# Patient Record
Sex: FEMALE
[De-identification: names, ages, dates, MRNs, and addresses within clinical notes are randomized per-mention and may not be internally consistent; named-entity substitution may affect disease eponyms.]

---

## 2018-01-22 ENCOUNTER — HOSPITAL ENCOUNTER (INPATIENT)
Dept: HOSPITAL 14 - H.ER | Age: 23
LOS: 1 days | Discharge: HOME | DRG: 395 | End: 2018-01-23
Attending: INTERNAL MEDICINE | Admitting: INTERNAL MEDICINE
Payer: COMMERCIAL

## 2018-01-22 DIAGNOSIS — D72.829: ICD-10-CM

## 2018-01-22 DIAGNOSIS — J45.909: ICD-10-CM

## 2018-01-22 DIAGNOSIS — K59.03: ICD-10-CM

## 2018-01-22 DIAGNOSIS — T40.2X5A: ICD-10-CM

## 2018-01-22 DIAGNOSIS — K37: Primary | ICD-10-CM

## 2018-01-22 LAB
ALBUMIN SERPL-MCNC: 4.6 G/DL (ref 3.5–5)
ALBUMIN/GLOB SERPL: 1.3 {RATIO} (ref 1–2.1)
ALT SERPL-CCNC: 50 U/L (ref 9–52)
AST SERPL-CCNC: 30 U/L (ref 14–36)
BACTERIA #/AREA URNS HPF: (no result) /[HPF]
BASOPHILS # BLD AUTO: 0 K/UL (ref 0–0.2)
BASOPHILS NFR BLD: 0.3 % (ref 0–2)
BILIRUB UR-MCNC: NEGATIVE MG/DL
BUN SERPL-MCNC: 8 MG/DL (ref 7–17)
CALCIUM SERPL-MCNC: 9.7 MG/DL (ref 8.4–10.2)
COLOR UR: (no result)
EOSINOPHIL # BLD AUTO: 0.1 K/UL (ref 0–0.7)
EOSINOPHIL NFR BLD: 0.6 % (ref 0–4)
ERYTHROCYTE [DISTWIDTH] IN BLOOD BY AUTOMATED COUNT: 13.5 % (ref 11.5–14.5)
GFR NON-AFRICAN AMERICAN: > 60
GLUCOSE UR STRIP-MCNC: (no result) MG/DL
HGB BLD-MCNC: 12.6 G/DL (ref 12–16)
LEUKOCYTE ESTERASE UR-ACNC: (no result) LEU/UL
LIPASE SERPL-CCNC: 77 U/L (ref 23–300)
LYMPHOCYTES # BLD AUTO: 2.9 K/UL (ref 1–4.3)
LYMPHOCYTES NFR BLD AUTO: 25.6 % (ref 20–40)
MCH RBC QN AUTO: 30 PG (ref 27–31)
MCHC RBC AUTO-ENTMCNC: 33.5 G/DL (ref 33–37)
MCV RBC AUTO: 89.7 FL (ref 81–99)
MONOCYTES # BLD: 0.7 K/UL (ref 0–0.8)
MONOCYTES NFR BLD: 6.4 % (ref 0–10)
NEUTROPHILS # BLD: 7.7 K/UL (ref 1.8–7)
NEUTROPHILS NFR BLD AUTO: 67.1 % (ref 50–75)
NRBC BLD AUTO-RTO: 0.1 % (ref 0–0)
PH UR STRIP: 6 [PH] (ref 5–8)
PLATELET # BLD: 277 K/UL (ref 130–400)
PMV BLD AUTO: 7.3 FL (ref 7.2–11.7)
PROT UR STRIP-MCNC: NEGATIVE MG/DL
RBC # BLD AUTO: 4.2 MIL/UL (ref 3.8–5.2)
RBC # UR STRIP: (no result) /UL
SP GR UR STRIP: 1.01 (ref 1–1.03)
SQUAMOUS EPITHIAL: 2 /HPF (ref 0–5)
URINE CLARITY: CLEAR
URINE NITRATE: NEGATIVE
UROBILINOGEN UR-MCNC: (no result) MG/DL (ref 0.2–1)
WBC # BLD AUTO: 11.5 K/UL (ref 4.8–10.8)

## 2018-01-22 NOTE — ED PDOC
HPI: Abdomen


Time Seen by Provider: 01/22/18 20:31


Chief Complaint (Nursing): Abdominal Pain


Chief Complaint (Provider): Abdominal pain


History Per: Patient


History/Exam Limitations: no limitations


Onset/Duration Of Symptoms: Days (2)


Additional Complaint(s): 





Patient is a 23 y/o female with no significant past medical history presenting 

to the emergency department for intermittent epigastric pain ongoing for two 

days. Reports that she always had stomach problems especially with eating. 

Patient was referred by her PMD for an abdominal CT to rule out appendicitis. 

Denies any current pain, fever, vomiting, nausea, constipation, or other 

complaints.





PMD: Dr. Les Angulo





Past Medical History


Reviewed: Historical Data, Nursing Documentation, Vital Signs


Vital Signs: 


 Last Vital Signs











Temp  98.3 F   01/23/18 01:11


 


Pulse  74   01/23/18 01:11


 


Resp  19   01/23/18 01:11


 


BP  113/72   01/23/18 01:11


 


Pulse Ox  96   01/23/18 01:11














- Family History


Family History: States: Unknown Family Hx





- Social History


Current smoker - smoking cessation education provided: No


Ex-Smoker (has not smoked in the last 12 months): No


Alcohol: Social


Drugs: Denies





- Home Medications


Home Medications: 


 Ambulatory Orders











 Medication  Instructions  Recorded


 


No Known Home Med  01/23/18














- Allergies


Allergies/Adverse Reactions: 


 Allergies











Allergy/AdvReac Type Severity Reaction Status Date / Time


 


No Known Allergies Allergy   Verified 01/22/18 19:53














Review of Systems


ROS Statement: Except As Marked, All Systems Reviewed And Found Negative


Constitutional: Negative for: Fever


Gastrointestinal: Positive for: Abdominal Pain (epigastric).  Negative for: 

Nausea, Vomiting, Constipation





Physical Exam





- Reviewed


Nursing Documentation Reviewed: Yes


Vital Signs Reviewed: Yes





- Physical Exam


Appears: Positive for: Well, Non-toxic, No Acute Distress


Head Exam: Positive for: ATRAUMATIC, NORMAL INSPECTION, NORMOCEPHALIC


Skin: Positive for: Normal Color, Warm, Dry


Eye Exam: Positive for: Normal appearance


Neck: Positive for: Normal


Cardiovascular/Chest: Positive for: Regular Rate, Rhythm


Respiratory: Negative for: Accessory Muscle Use, Respiratory Distress


Gastrointestinal/Abdominal: Positive for: Normal Exam, Soft, Tenderness (

Minimal lower abdominal tenderness bilaterally, and epigastric tenderness)


Extremity: Positive for: Normal ROM.  Negative for: Pedal Edema


Neurologic/Psych: Positive for: Alert, Oriented (x3)





- Laboratory Results


Result Diagrams: 


 01/22/18 21:33





 01/22/18 21:33





- ECG


O2 Sat by Pulse Oximetry: 100 (RA)


Pulse Ox Interpretation: Normal





Medical Decision Making


Medical Decision Making: 





Time: 20:45





Initial impression: gastritis vs. appendicitis vs. ulcer





Initial plan:


 Abdominal/pelvic CT scan


 CMP


 Lactic Acid test


 Lipase


 ED Urine Pregnancy


 CBC


 Pepcid 20 mg 


 Blood Culture


 Urine Culture


 Urinalysis


 Reevaluation





Time: 23:14


CT Abd and Pelvis w/ IV Contrast Findings:


Lower thorax: No acute findings.





ABDOMEN:


Liver: Unremarkable. No mass.


Gallbladder and bile ducts: Unremarkable. No calcified stones. No ductal 

dilation.


Pancreas: Unremarkable. No mass. No ductal dilation.


Spleen: Unremarkable. No splenomegaly.


Adrenals: Unremarkable. No mass.


Kidneys and ureters: Unremarkable. No solid mass. No hydronephrosis.


Stomach and bowel: Unremarkable. No obstruction. No mucosal thickening.


Appendix: The appendix measures approximately 8 mm in diameter with a fluid-

filled lumen and wall


thickness of approximately 3 mm with slight enhancement. There is question of 

slight surrounding


induration may reflect early appendicitis.





PELVIS:


Bladder: The urinary bladder is decompressed but appears grossly normal.


Reproductive: IUD in the lower uterine segment and cervix.





ABDOMEN and PELVIS:


Intraperitoneal space: Unremarkable. No free air. No significant fluid 

collection.


Bones/joints: No acute fracture. No dislocation.


Soft tissues: Unremarkable.


Vasculature: Unremarkable. No abdominal aortic aneurysm.


Lymph nodes: Unremarkable. No enlarged lymph nodes.





IMPRESSION:


1. Low position of an IUD in the lower uterine segment and cervix.


2. The appendix measures 8 mm with a fluid-filled lumen and slight thickening 

and enhancement of


the wall. There is question of slight surrounding induration and findings may 

reflect early appendicitis.


3. Otherwise negative CT abdomen/pelvis. No epigastric abnormalities are 

identified.





2330


Pt. made NPO.  Spoke with Dr. Orta who agrees to consult.  Surgical 

resident aware.  Dr. Nugent accepts patient.  Results explained to patient and 

questions answered.  Patient states pain is 1/10 at this time.





--------------------------------------------------------------------------------

----------------------------------------------


Scribe Attestation:


Documented by Doris Ellis and Ricardo Mattson, acting as scribes for Chi Huang MD.





Provider Scribe Attestation:


All medical record entries made by the Scribe were at my direction and 

personally dictated by me. I have reviewed the chart and agree that the record 

accurately reflects my personal performance of the history, physical exam, 

medical decision making, and the department course for this patient. I have 

also personally directed, reviewed, and agree with the discharge instructions 

and disposition.





Disposition





- Clinical Impression


Clinical Impression: 


 Appendicitis








- Disposition


Disposition Time: 23:30


Condition: STABLE

## 2018-01-22 NOTE — CP.PCM.HP
History of Present Illness





- History of Present Illness


History of Present Illness: 


PCP: Les Angulo MD





Chief complaint: Abdominal Pain





HPI: 22 years old female with  hx of stomach upsets where she takes TUMS. She 

comes referred by her PMD with 2 days of a localized, dull intermittent 

Periumbilical, epigastric pain with intensity 6/10. No fever, nausea, vomits. 

No diarrhea ,dysuria nor urinary frequencies.








PMH: Childhood Asthma





PSH: Denies





SH: No illegal drug use; Never Smoked; occasional Alcohol, Live with family ; 

works as a teacher





FH: States: Unknown Family Hx





Allergies: NKDA





Medication: Reviewed





- Social History


Current smoker - smoking cessation education provided: No


Ex-Smoker (has not smoked in the last 12 months): No


Alcohol: Social


Drugs: Denies





- Allergies


Allergies/Adverse Reactions: 








Present on Admission





- Present on Admission


Any Indicators Present on Admission: No


History of DVT/PE: No


History of Uncontrolled Diabetes: No


Urinary Catheter: No


Decubitus Ulcer Present: No





Review of Systems





- Constitutional


Constitutional: absent: Chills, Fatigue, Fever, Headache





- EENT


Eyes: Requires Corrective Lenses.  absent: Diplopia, Floaters, Sees Flashes


Ears: absent: Decreased Hearing, Ear Discharge, Ear Pain, Tinnitus


Nose/Mouth/Throat: absent: Epistaxis, Nasal Congestion, Nasal Discharge, Sinus 

Pain, Sinus Pressure





- Cardiovascular


Cardiovascular: absent: Chest Pain, Dyspnea, Edema





- Respiratory


Respiratory: absent: Cough, Dyspnea, Wheezing, Stridor, Chest Congestion





- Gastrointestinal


Gastrointestinal: Abdominal Pain.  absent: Constipation, Diarrhea, Vomiting





- Genitourinary


Genitourinary: absent: Dysuria, Flank Pain, Hematuria, Urinary Frequency





- Musculoskeletal


Musculoskeletal: absent: Arthralgias, Back Pain, Joint Swelling, Numbness





- Integumentary


Integumentary: absent: Pruritus, Rash, Skin Ulcer, Sores, Striae, Swelling





- Neurological


Neurological: absent: Confusion, Dizziness, Focal Weakness, Headaches, Weakness





- Psychiatric


Psychiatric: absent: Anxiety, Depression, Panic Attacks





- Endocrine


Endocrine: absent: Palpitations, Polydipsia, Polyphagia, Polyuria





- Hematologic/Lymphatic


Hematologic: absent: Easy Bleeding, Easy Bruising





Past Patient History





- Past Social History


Smoking Status: Never Smoked


Chewing Tobacco Use: No


Cigar Use: No


Alcohol: Social


Drugs: Denies


Home Situation {Lives}: With Family





- CARDIAC


Hx Cardiac Disorders: No





- PULMONARY


Hx Asthma: Yes (Childhood Asthma)





- NEUROLOGICAL


Hx Neurological Disorder: No





- HEENT


Hx HEENT Problems: No





- RENAL


Hx Chronic Kidney Disease: No





- ENDOCRINE/METABOLIC


Hx Endocrine Disorders: No





- HEMATOLOGICAL/ONCOLOGICAL


Hx Blood Disorders: No





- INTEGUMENTARY


Hx Dermatological Problems: No





- MUSCULOSKELETAL/RHEUMATOLOGICAL


Hx Musculoskeletal Disorders: No





- GASTROINTESTINAL


Hx Gastrointestinal Disorders: No





- GENITOURINARY/GYNECOLOGICAL


Hx Genitourinary Disorders: No





- PSYCHIATRIC


Hx Psychophysiologic Disorder: No


Hx Substance Use: No





- SURGICAL HISTORY


Hx Surgeries: No





- ANESTHESIA


Hx Anesthesia: No





Meds


Allergies/Adverse Reactions: 


 Allergies











Allergy/AdvReac Type Severity Reaction Status Date / Time


 


No Known Allergies Allergy   Verified 01/22/18 19:53














Physical Exam





- Constitutional


Appears: No Acute Distress





- Head Exam


Head Exam: ATRAUMATIC, NORMAL INSPECTION, NORMOCEPHALIC





- Eye Exam


Eye Exam: EOMI, Normal appearance


Pupil Exam: NORMAL ACCOMODATION, PERRL





- ENT Exam


ENT Exam: Mucous Membranes Moist, Normal Exam, Normal External Ear Exam





- Neck Exam


Neck exam: Positive for: Full Rom, Normal Inspection.  Negative for: 

Lymphadenopathy, Tenderness





- Respiratory Exam


Respiratory Exam: Clear to Auscultation Bilateral.  absent: Rales, Rhonchi, 

Wheezes





- Cardiovascular Exam


Cardiovascular Exam: REGULAR RHYTHM, RRR, +S1, +S2.  absent: Gallop, JVD, +S4





- GI/Abdominal Exam


GI & Abdominal Exam: Normal Bowel Sounds, Soft.  absent: Organomegaly, 

Tenderness





- Rectal Exam


Rectal Exam: Deferred





- Extremities Exam


Extremities exam: Positive for: normal inspection.  Negative for: full ROM, 

joint swelling, pedal edema





- Back Exam


Back exam: NORMAL INSPECTION.  absent: CVA tenderness (L), CVA tenderness (R)





- Neurological Exam


Neurological exam: Alert, CN II-XII Intact, Oriented x3, Reflexes Normal





- Psychiatric Exam


Psychiatric exam: Normal Affect, Normal Mood





- Skin


Skin Exam: Dry, Intact, Normal Color, Warm





Results





- Vital Signs


Recent Vital Signs: 





 Last Vital Signs











Temp  98.3 F   01/22/18 19:49


 


Pulse  72   01/22/18 19:49


 


Resp  16   01/22/18 19:49


 


BP  123/77   01/22/18 19:49


 


Pulse Ox  100   01/22/18 23:20














- Labs


Result Diagrams: 


 01/22/18 21:33





 01/22/18 21:33


Labs: 





 Laboratory Results - last 24 hr











  01/22/18 01/22/18 01/22/18





  21:33 21:33 21:33


 


WBC  11.5 H  


 


RBC  4.20  


 


Hgb  12.6  


 


Hct  37.6  


 


MCV  89.7  


 


MCH  30.0  


 


MCHC  33.5  


 


RDW  13.5  


 


Plt Count  277  


 


MPV  7.3  


 


Neut % (Auto)  67.1  


 


Lymph % (Auto)  25.6  


 


Mono % (Auto)  6.4  


 


Eos % (Auto)  0.6  


 


Baso % (Auto)  0.3  


 


Neut #  7.7 H  


 


Lymph #  2.9  


 


Mono #  0.7  


 


Eos #  0.1  


 


Baso #  0.0  


 


Sodium   141 


 


Potassium   4.1 


 


Chloride   102 


 


Carbon Dioxide   26 


 


Anion Gap   17 


 


BUN   8 


 


Creatinine   0.6 L 


 


Est GFR ( Amer)   > 60 


 


Est GFR (Non-Af Amer)   > 60 


 


Random Glucose   91 


 


Lactic Acid   


 


Calcium   9.7 


 


Total Bilirubin   0.4 


 


AST   30 


 


ALT   50 


 


Alkaline Phosphatase   74 


 


Total Protein   8.1 


 


Albumin   4.6 


 


Globulin   3.6 


 


Albumin/Globulin Ratio   1.3 


 


Lipase   77 


 


Urine Color    Straw


 


Urine Clarity    Clear


 


Urine pH    6.0


 


Ur Specific Gravity    1.012


 


Urine Protein    Negative


 


Urine Glucose (UA)    Neg


 


Urine Ketones    Trace


 


Urine Blood    Small


 


Urine Nitrate    Negative


 


Urine Bilirubin    Negative


 


Urine Urobilinogen    0.2-1.0


 


Ur Leukocyte Esterase    Small


 


Urine RBC (Auto)    7 H


 


Urine Microscopic WBC    8 H


 


Ur Squamous Epith Cells    2


 


Urine Bacteria    Rare














  01/22/18





  21:33


 


WBC 


 


RBC 


 


Hgb 


 


Hct 


 


MCV 


 


MCH 


 


MCHC 


 


RDW 


 


Plt Count 


 


MPV 


 


Neut % (Auto) 


 


Lymph % (Auto) 


 


Mono % (Auto) 


 


Eos % (Auto) 


 


Baso % (Auto) 


 


Neut # 


 


Lymph # 


 


Mono # 


 


Eos # 


 


Baso # 


 


Sodium 


 


Potassium 


 


Chloride 


 


Carbon Dioxide 


 


Anion Gap 


 


BUN 


 


Creatinine 


 


Est GFR ( Amer) 


 


Est GFR (Non-Af Amer) 


 


Random Glucose 


 


Lactic Acid  0.8


 


Calcium 


 


Total Bilirubin 


 


AST 


 


ALT 


 


Alkaline Phosphatase 


 


Total Protein 


 


Albumin 


 


Globulin 


 


Albumin/Globulin Ratio 


 


Lipase 


 


Urine Color 


 


Urine Clarity 


 


Urine pH 


 


Ur Specific Gravity 


 


Urine Protein 


 


Urine Glucose (UA) 


 


Urine Ketones 


 


Urine Blood 


 


Urine Nitrate 


 


Urine Bilirubin 


 


Urine Urobilinogen 


 


Ur Leukocyte Esterase 


 


Urine RBC (Auto) 


 


Urine Microscopic WBC 


 


Ur Squamous Epith Cells 


 


Urine Bacteria 














- Imaging and Cardiology


  ** CT scan - abdomen


Status: Report reviewed by me


Additional comment: 





EXAM:


CT Abdomen and Pelvis With Intravenous Contrast


EXAM DATE/TIME:


Exam ordered 1/22/2018 8:45 PM





FINDINGS:


Lower thorax: No acute findings.


ABDOMEN:


Liver: Unremarkable. No mass.


Gallbladder and bile ducts: Unremarkable. No calcified stones. No ductal 

dilation.


Pancreas: Unremarkable. No mass. No ductal dilation.


Spleen: Unremarkable. No splenomegaly.


Adrenals: Unremarkable. No mass.


Kidneys and ureters: Unremarkable. No solid mass. No hydronephrosis.


Stomach and bowel: Unremarkable. No obstruction. No mucosal thickening.


Appendix: The appendix measures approximately 8 mm in diameter with a fluid-

filled lumen and wall


thickness of approximately 3 mm with slight enhancement. There is question of 

slight surrounding


induration may reflect early appendicitis.


PELVIS:


Bladder: The urinary bladder is decompressed but appears grossly normal.


Reproductive: IUD in the lower uterine segment and cervix.


ABDOMEN and PELVIS:


Intraperitoneal space: Unremarkable. No free air. No significant fluid 

collection.


Bones/joints: No acute fracture. No dislocation.


Soft tissues: Unremarkable.


Vasculature: Unremarkable. No abdominal aortic aneurysm.


Lymph nodes: Unremarkable. No enlarged lymph nodes.





IMPRESSION:


1. Low position of an IUD in the lower uterine segment and cervix.


2. The appendix measures 8 mm with a fluid-filled lumen and slight thickening 

and enhancement of


the wall. There is question of slight surrounding induration and findings may 

reflect early appendicitis.


3. Otherwise negative CT abdomen/pelvis. No epigastric abnormalities are 

identified.








Assessment & Plan





- Assessment and Plan (Free Text)


Assessment: 





#. Early Appendicites


#. Leukocytosis





Plan: 


22 years old female with  hx of stomach upsets where she takes TUMS. She comes 

referred by her PMD with 2 days of a localized, dull intermittent Periumbilical

, epigastric pain with intensity 6/10. No fever, nausea, vomits. No diarrhea ,

dysuria nor urinary frequencies.





#. Early Appendicites causing abdominal pain


- Consult Surgery Dr Coffey


- NPO


- IV fluids


- Zosyn


- Pepcid





#. Leukocytosis


- Follow CBC





# Stress ulcer prophylaxis with Pepcid





#. DVT prophylaxis with SCD





#. Code Status: Full








- Date & Time


Date: 01/22/18


Time: 23:39

## 2018-01-23 VITALS
SYSTOLIC BLOOD PRESSURE: 117 MMHG | OXYGEN SATURATION: 98 % | DIASTOLIC BLOOD PRESSURE: 73 MMHG | TEMPERATURE: 97.9 F | RESPIRATION RATE: 20 BRPM | HEART RATE: 64 BPM

## 2018-01-23 LAB
BASOPHILS # BLD AUTO: 0 K/UL (ref 0–0.2)
BASOPHILS NFR BLD: 0.5 % (ref 0–2)
EOSINOPHIL # BLD AUTO: 0.1 K/UL (ref 0–0.7)
EOSINOPHIL NFR BLD: 1.2 % (ref 0–4)
ERYTHROCYTE [DISTWIDTH] IN BLOOD BY AUTOMATED COUNT: 13.1 % (ref 11.5–14.5)
HGB BLD-MCNC: 11.9 G/DL (ref 12–16)
LYMPHOCYTES # BLD AUTO: 2.4 K/UL (ref 1–4.3)
LYMPHOCYTES NFR BLD AUTO: 31.1 % (ref 20–40)
MCH RBC QN AUTO: 30.1 PG (ref 27–31)
MCHC RBC AUTO-ENTMCNC: 33.8 G/DL (ref 33–37)
MCV RBC AUTO: 89 FL (ref 81–99)
MONOCYTES # BLD: 0.7 K/UL (ref 0–0.8)
MONOCYTES NFR BLD: 9.1 % (ref 0–10)
NEUTROPHILS # BLD: 4.5 K/UL (ref 1.8–7)
NEUTROPHILS NFR BLD AUTO: 58.1 % (ref 50–75)
NRBC BLD AUTO-RTO: 0 % (ref 0–0)
PLATELET # BLD: 268 K/UL (ref 130–400)
PMV BLD AUTO: 7.5 FL (ref 7.2–11.7)
RBC # BLD AUTO: 3.94 MIL/UL (ref 3.8–5.2)
WBC # BLD AUTO: 7.8 K/UL (ref 4.8–10.8)

## 2018-01-23 RX ADMIN — WATER SCH MLS/HR: 1 INJECTION INTRAMUSCULAR; INTRAVENOUS; SUBCUTANEOUS at 05:33

## 2018-01-23 RX ADMIN — WATER SCH MLS/HR: 1 INJECTION INTRAMUSCULAR; INTRAVENOUS; SUBCUTANEOUS at 10:59

## 2018-01-23 NOTE — CP.PCM.PN
Subjective





- Date & Time of Evaluation


Date of Evaluation: 01/23/18


Time of Evaluation: 08:21





- Subjective


Subjective: 





22 years old female with no significant PMHx was seen and evaluated at bedside 

for acute stomach pain. Patient is AAOx3 and is in NAD. Patient denies of any 

acute overnight events. Patient reports that she is feeling a lot better than 

she did yesterday but still feels little tenderness on the right side of her 

belly. Denies of any recent F/N/V/C/SOB/CP/headache/diarrhea. Denies of having 

any other complains at this time. 





Objective





- Vital Signs/Intake and Output


Vital Signs (last 24 hours): 


 











Temp Pulse Resp BP Pulse Ox


 


 97.9 F   64   20   117/73   98 


 


 01/23/18 08:16  01/23/18 08:16  01/23/18 08:16  01/23/18 08:16  01/23/18 08:16











- Medications


Medications: 


 Current Medications





Famotidine (Pepcid)  20 mg IVP DAILY [facility]


Hydromorphone HCl (Dilaudid)  0.5 mg IVP Q3H PRN


   PRN Reason: Pain, severe (8-10)


Piperacillin Sod/Tazobactam (Sod 3.375 gm/ Sodium Chloride)  100 mls @ 100 mls/

hr IVPB Q6H [facility]MI[facility]


   PRN Reason: Protocol


   Last Admin: 01/23/18 05:33 Dose:  100 mls/hr


Lactated Ringer's (Lactated Ringer's)  1,000 mls @ 125 mls/hr IV .Q8H EMILE


   Last Admin: 01/23/18 05:33 Dose:  125 mls/hr


Ondansetron HCl (Zofran Inj)  4 mg IVP Q4 PRN


   PRN Reason: Nausea/Vomiting











- Labs


Labs: 


 





 01/23/18 05:45 





 01/22/18 21:33 











- Constitutional


Appears: Well, Non-toxic, No Acute Distress





- Head Exam


Head Exam: ATRAUMATIC





- Eye Exam


Eye Exam: Normal appearance





- ENT Exam


ENT Exam: Normal Exam





- Neck Exam


Neck Exam: Full ROM, Normal Inspection





- Respiratory Exam


Respiratory Exam: Clear to Ausculation Bilateral, NORMAL BREATHING PATTERN.  

absent: Rales, Rhonchi, Wheezes





- Cardiovascular Exam


Cardiovascular Exam: REGULAR RHYTHM, +S1, +S2





- GI/Abdominal Exam


GI & Abdominal Exam: Soft, Normal Bowel Sounds


Additional comments: 





dull periumbilical, epigastric tenderness





- Rectal Exam


Rectal Exam: Deferred





- Extremities Exam


Extremities Exam: Full ROM, Normal Capillary Refill, Normal Inspection.  absent

: Calf Tenderness, Joint Swelling, Pedal Edema, Tenderness





- Back Exam


Back Exam: Full ROM, NORMAL INSPECTION





- Neurological Exam


Neurological Exam: Alert, Awake, Oriented x3





- Psychiatric Exam


Psychiatric exam: Normal Affect, Normal Mood





- Skin


Skin Exam: Intact, Normal Color, Warm





Assessment and Plan





- Assessment and Plan (Free Text)


Assessment: 





22 years old female with no significant PMHx was at bedside for acute stomach 

pain secondary to early appendicitis


Plan: 





1). Early Appendicites causing abdominal pain


- CT Abdomen


   - Reveals 8mm fluid filled appendix with thickening.


- Surgery Consult - Dr Coffey; Recommendations appreciated


   - no surgery recommended at this time


- GI consult - Dr. Salinas 


   - Outpatient Endoscopy


   - Regular diet


   - Abx


- IV fluids


- Zosyn


- Pepcid





2). DVT prophylaxis 


- SCD


- Ambulating

## 2018-01-23 NOTE — CP.PCM.PCO
Physician Communication Note





- Physician Communication Note


Physician Communication Note: No appendicitis - start diet - enema - d/c if 

tolerates w/ BM

## 2018-01-23 NOTE — CP.PCM.DIS
<[facility] - Last Filed: 01/23/18 14:28>





Provider





- Provider


Date of Admission: 


01/22/18 23:30





Attending physician: 


Aniceto Nugent





Time Spent in preparation of Discharge (in minutes): 20





Hospital Course





- Lab Results


Lab Results: 


 Most Recent Lab Values











WBC  7.8 K/uL (4.8-10.8)   01/23/18  05:45    


 


RBC  3.94 Mil/uL (3.80-5.20)   01/23/18  05:45    


 


Hgb  11.9 g/dL (12.0-16.0)  L  01/23/18  05:45    


 


Hct  35.0 % (34.0-47.0)   01/23/18  05:45    


 


MCV  89.0 fl (81.0-99.0)   01/23/18  05:45    


 


MCH  30.1 pg (27.0-31.0)   01/23/18  05:45    


 


MCHC  33.8 g/dL (33.0-37.0)   01/23/18  05:45    


 


RDW  13.1 % (11.5-14.5)   01/23/18  05:45    


 


Plt Count  268 K/uL (130-400)   01/23/18  05:45    


 


MPV  7.5 fl (7.2-11.7)   01/23/18  05:45    


 


Neut % (Auto)  58.1 % (50.0-75.0)   01/23/18  05:45    


 


Lymph % (Auto)  31.1 % (20.0-40.0)   01/23/18  05:45    


 


Mono % (Auto)  9.1 % (0.0-10.0)   01/23/18  05:45    


 


Eos % (Auto)  1.2 % (0.0-4.0)   01/23/18  05:45    


 


Baso % (Auto)  0.5 % (0.0-2.0)   01/23/18  05:45    


 


Neut #  4.5 K/uL (1.8-7.0)   01/23/18  05:45    


 


Lymph #  2.4 K/uL (1.0-4.3)   01/23/18  05:45    


 


Mono #  0.7 K/uL (0.0-0.8)   01/23/18  05:45    


 


Eos #  0.1 K/uL (0.0-0.7)   01/23/18  05:45    


 


Baso #  0.0 K/uL (0.0-0.2)   01/23/18  05:45    


 


Sodium  141 mmol/l (132-148)   01/22/18  21:33    


 


Potassium  4.1 MMOL/L (3.6-5.0)   01/22/18  21:33    


 


Chloride  102 mmol/L ()   01/22/18  21:33    


 


Carbon Dioxide  26 mmol/L (22-30)   01/22/18  21:33    


 


Anion Gap  17  (10-20)   01/22/18  21:33    


 


BUN  8 mg/dl (7-17)   01/22/18  21:33    


 


Creatinine  0.6 mg/dl (0.7-1.2)  L  01/22/18  21:33    


 


Est GFR ( Amer)  > 60   01/22/18  21:33    


 


Est GFR (Non-Af Amer)  > 60   01/22/18  21:33    


 


Random Glucose  91 mg/dL ()   01/22/18  21:33    


 


Lactic Acid  0.8 MMOL/L (0.7-2.1)   01/22/18  21:33    


 


Calcium  9.7 mg/dL (8.4-10.2)   01/22/18  21:33    


 


Total Bilirubin  0.4 mg/dl (0.2-1.3)   01/22/18  21:33    


 


AST  30 U/L (14-36)   01/22/18  21:33    


 


ALT  50 U/L (9-52)   01/22/18  21:33    


 


Alkaline Phosphatase  74 U/L ()   01/22/18  21:33    


 


Total Protein  8.1 G/DL (6.3-8.2)   01/22/18  21:33    


 


Albumin  4.6 g/dL (3.5-5.0)   01/22/18  21:33    


 


Globulin  3.6 gm/dL (2.2-3.9)   01/22/18  21:33    


 


Albumin/Globulin Ratio  1.3  (1.0-2.1)   01/22/18  21:33    


 


Lipase  77 U/L ()   01/22/18  21:33    


 


Urine Color  Straw  (YELLOW)   01/22/18  21:33    


 


Urine Clarity  Clear  (Clear)   01/22/18 21:33    


 


Urine pH  6.0  (5.0-8.0)   01/22/18 21:33    


 


Ur Specific Gravity  1.012  (1.003-1.030)   01/22/18 21:33    


 


Urine Protein  Negative mg/dL (NEGATIVE)   01/22/18 21:33    


 


Urine Glucose (UA)  Neg mg/dL (Normal)   01/22/18 21:33    


 


Urine Ketones  Trace mg/dL (NEGATIVE)   01/22/18 21:33    


 


Urine Blood  Small  (NEGATIVE)   01/22/18  21:33    


 


Urine Nitrate  Negative  (NEGATIVE)   01/22/18 21:33    


 


Urine Bilirubin  Negative  (NEGATIVE)   01/22/18 21:33    


 


Urine Urobilinogen  0.2-1.0 mg/dL (0.2-1.0)   01/22/18 21:33    


 


Ur Leukocyte Esterase  Small Chago/uL (Negative)   01/22/18 21:33    


 


Urine RBC (Auto)  7 /hpf (0-3)  H  01/22/18 21:33    


 


Urine Microscopic WBC  8 /hpf (0-5)  H  01/22/18 21:33    


 


Ur Squamous Epith Cells  2 /hpf (0-5)   01/22/18 21:33    


 


Urine Bacteria  Rare  (<OCC)   01/22/18 21:33    














- Hospital Course


Hospital Course: 





22 years old female patient with no significant PMHx was admitted to the 

hospital for acute stomach pain with suspicion of appendicitis. Upon arrival to 

the ED, patient reported that she had been having 2 days of a localized, dull 

intermittent periumbilical, epigastric pain with intensity 6/10. Patient was 

observed to have elevated WBC in the ED. Abdominal CT revealed 8mm fluid filled 

appendix with thickening. Patient was placed on NPO and was given zosyn and 

fluids. Patient was seen by general surgery and GI. Patient's condition 

improved while in the hospital. General surgery recommended for patient to go 

home with Augmentin 875 for 10 days. Patient will be following up with Dr. Salinas as an outpatient. 





1). Early Appendicites causing abdominal pain


- CT Abdomen


   - Reveals 8mm fluid filled appendix with thickening.


- Surgery Consult - Dr Coffey; Recommendations appreciated


   - no surgery recommended at this time


- GI consult - Dr. Salinas 


   - Outpatient Endoscopy


   - Regular diet


   - Abx as per surgery


   - Protonix


- IV fluids


- Zosyn


- Pepcid





- Date & Time of H&P


Date of H&P: 01/23/18


Time of H&P: 14:28





Discharge Exam





- Head Exam


Head Exam: ATRAUMATIC, NORMOCEPHALIC





- Eye Exam


Eye Exam: Normal appearance


Pupil Exam: NORMAL ACCOMODATION





- ENT Exam


ENT Exam: Normal Exam





- Neck Exam


Neck exam: Full Rom, Normal Inspection





- Respiratory Exam


Respiratory Exam: Clear to PA & Lateral, NORMAL BREATHING PATTERN, 

UNREMARKABLE.  absent: Rales, Rhonchi, Wheezes





- Cardiovascular Exam


Cardiovascular Exam: REGULAR RHYTHM, +S1, +S2





- GI/Abdominal Exam


GI & Abdominal Exam: Normal Bowel Sounds, Soft, Unremarkable.  absent: Mass, 

Organomegaly





- Rectal Exam


Rectal Exam: Deferred





- Extremities Exam


Extremities exam: full ROM, normal capillary refill, normal inspection, pedal 

pulses present





- Back Exam


Back exam: FULL ROM, NORMAL INSPECTION





- Neurological Exam


Neurological exam: Alert, Oriented x3





- Psychiatric Exam


Psychiatric exam: Normal Affect, Normal Mood





- Skin


Skin Exam: Intact, Normal Color, Warm





Discharge Plan





- Discharge Medications


Prescriptions: 


Amoxicillin/Clavulanate [Augmentin 875 MG-125 MG] 1 tab PO Q12 #20 tab


Pantoprazole [Protonix EC Tab] 40 mg PO DAILY #30 ect





- Follow Up Plan


Condition: STABLE


Disposition: HOME/ ROUTINE


Instructions:  Acute Abdominal Pain (DC)


Additional Instructions: 


follow up with  in 7-10days


Referrals: 


Rita XIE,MD Sarkis [Medical Doctor] - 


Otoniel Muñoz MD [Staff Provider] - 





<Tammie Ledbetter - Last Filed: 01/23/18 15:33>





Provider





- Provider


Date of Admission: 


01/22/18 23:30





Attending physician: 


Aniceto Nugent








[facility] Course





- Lab Results


Lab Results: 


 Most Recent Lab Values











WBC  7.8 K/uL (4.8-10.8)   01/23/18  05:45    


 


RBC  3.94 Mil/uL (3.80-5.20)   01/23/18  05:45    


 


Hgb  11.9 g/dL (12.0-16.0)  L  01/23/18  05:45    


 


Hct  35.0 % (34.0-47.0)   01/23/18  05:45    


 


MCV  89.0 fl (81.0-99.0)   01/23/18  05:45    


 


MCH  30.1 pg (27.0-31.0)   01/23/18  05:45    


 


MCHC  33.8 g/dL (33.0-37.0)   01/23/18  05:45    


 


RDW  13.1 % (11.5-14.5)   01/23/18  05:45    


 


Plt Count  268 K/uL (130-400)   01/23/18  05:45    


 


MPV  7.5 fl (7.2-11.7)   01/23/18  05:45    


 


Neut % (Auto)  58.1 % (50.0-75.0)   01/23/18  05:45    


 


Lymph % (Auto)  31.1 % (20.0-40.0)   01/23/18  05:45    


 


Mono % (Auto)  9.1 % (0.0-10.0)   01/23/18  05:45    


 


Eos % (Auto)  1.2 % (0.0-4.0)   01/23/18  05:45    


 


Baso % (Auto)  0.5 % (0.0-2.0)   01/23/18  05:45    


 


Neut #  4.5 K/uL (1.8-7.0)   01/23/18  05:45    


 


Lymph #  2.4 K/uL (1.0-4.3)   01/23/18  05:45    


 


Mono #  0.7 K/uL (0.0-0.8)   01/23/18  05:45    


 


Eos #  0.1 K/uL (0.0-0.7)   01/23/18  05:45    


 


Baso #  0.0 K/uL (0.0-0.2)   01/23/18  05:45    


 


Sodium  141 mmol/l (132-148)   01/22/18  21:33    


 


Potassium  4.1 MMOL/L (3.6-5.0)   01/22/18  21:33    


 


Chloride  102 mmol/L ()   01/22/18  21:33    


 


Carbon Dioxide  26 mmol/L (22-30)   01/22/18  21:33    


 


Anion Gap  17  (10-20)   01/22/18  21:33    


 


BUN  8 mg/dl (7-17)   01/22/18  21:33    


 


Creatinine  0.6 mg/dl (0.7-1.2)  L  01/22/18  21:33    


 


Est GFR ( Amer)  > 60   01/22/18  21:33    


 


Est GFR (Non-Af Amer)  > 60   01/22/18  21:33    


 


Random Glucose  91 mg/dL ()   01/22/18  21:33    


 


Lactic Acid  0.8 MMOL/L (0.7-2.1)   01/22/18  21:33    


 


Calcium  9.7 mg/dL (8.4-10.2)   01/22/18  21:33    


 


Total Bilirubin  0.4 mg/dl (0.2-1.3)   01/22/18  21:33    


 


AST  30 U/L (14-36)   01/22/18  21:33    


 


ALT  50 U/L (9-52)   01/22/18  21:33    


 


Alkaline Phosphatase  74 U/L ()   01/22/18  21:33    


 


Total Protein  8.1 G/DL (6.3-8.2)   01/22/18  21:33    


 


Albumin  4.6 g/dL (3.5-5.0)   01/22/18  21:33    


 


Globulin  3.6 gm/dL (2.2-3.9)   01/22/18  21:33    


 


Albumin/Globulin Ratio  1.3  (1.0-2.1)   01/22/18  21:33    


 


Lipase  77 U/L ()   01/22/18  21:33    


 


Urine Color  Straw  (YELLOW)   01/22/18  21:33    


 


Urine Clarity  Clear  (Clear)   01/22/18  21:33    


 


Urine pH  6.0  (5.0-8.0)   01/22/18  21:33    


 


Ur Specific Gravity  1.012  (1.003-1.030)   01/22/18  21:33    


 


Urine Protein  Negative mg/dL (NEGATIVE)   01/22/18 21:33    


 


Urine Glucose (UA)  Neg mg/dL (Normal)   01/22/18 21:33    


 


Urine Ketones  Trace mg/dL (NEGATIVE)   01/22/18  21:33    


 


Urine Blood  Small  (NEGATIVE)   01/22/18  21:33    


 


Urine Nitrate  Negative  (NEGATIVE)   01/22/18 21:33    


 


Urine Bilirubin  Negative  (NEGATIVE)   01/22/18  21:33    


 


Urine Urobilinogen  0.2-1.0 mg/dL (0.2-1.0)   01/22/18  21:33    


 


Ur Leukocyte Esterase  Small Chago/uL (Negative)   01/22/18  21:33    


 


Urine RBC (Auto)  7 /hpf (0-3)  H  01/22/18  21:33    


 


Urine Microscopic WBC  8 /hpf (0-5)  H  01/22/18  21:33    


 


Ur Squamous Epith Cells  2 /hpf (0-5)   01/22/18  21:33    


 


Urine Bacteria  Rare  (<OCC)   01/22/18  21:33    














Attending/Attestation





- Attestation


I have personally seen and examined this patient.: Yes


I have fully participated in the care of the patient.: Yes


I have reviewed all pertinent clinical information, including history, physical 

exam and plan: Yes


Notes (Text): 





01/23/18 15:32


seen examined discussed [facility] resident dr. tashia johnson. agree with findings 

and plan as above


to note: zosyn adjusted to Augmentin, Zofran also given for nausea. Patient may 

take motrin for pain control. 


For follow up with PCP in one week.

## 2018-01-23 NOTE — CT
PROCEDURE:  CT Abdomen and Pelvis with contrast



HISTORY:

epig/yaritza-umb pain



COMPARISON:

None.



TECHNIQUE:

Contrast dose: 90 cc Omnipaque 300 



Radiation dose:



Total exam DLP = 401.09 mGy-cm.



This CT exam was performed using one or more of the following dose 

reduction techniques: Automated exposure control, adjustment of the 

mA and/or kV according to patient size, and/or use of iterative 

reconstruction technique.



FINDINGS:



LOWER THORAX:

Unremarkable. 



LIVER:

Unremarkable. No gross lesion or ductal dilatation. 



GALLBLADDER AND BILE DUCTS:

Unremarkable. 



PANCREAS:

Unremarkable. No gross lesion or ductal dilatation.



SPLEEN:

Unremarkable. 



ADRENALS:

Unremarkable. No mass. 



KIDNEYS AND URETERS:

Unremarkable. No hydronephrosis. No solid mass. 



VASCULATURE:

Unremarkable. No aortic aneurysm. 



BOWEL:

Unremarkable. No obstruction. No gross mural thickening. 



APPENDIX:

Mildly edematous appendix, thickening contrast-enhancing wall of the 

appendix consistent with early acute appendicitis. 



PERITONEUM:

Unremarkable. No free fluid. No free air. 



LYMPH NODES:

Unremarkable. No enlarged lymph nodes. 



BLADDER:

Unremarkable. 



REPRODUCTIVE:

Low-lying intrauterine contraceptive device, of portions are in the 

cervix common the remainder in the lower uterine segment. Multiple 

subcentimeter follicles. 



BONES:

No acute fracture. 



OTHER FINDINGS:

None.



IMPRESSION:

Early acute appendicitis.



Additional benign and/or incidental findings described above.



________________________________________________



Concordant results (preliminary interpretation) provided by [employer].



Procedure Completed: 22:16



Preliminary ([employer]) Report: Dictated and Authenticated: 23:14



Final Interpretation: 09:35  Januray 23, 2018.

## 2022-05-05 NOTE — CP.PCM.CON
History of Present Illness





- History of Present Illness


History of Present Illness: 





Addendum to Dr Lanier note- Not able to sign his note due to computer glitch





Patient seen and examined with GI fellow earlier in the day. This is a 22 yr 

old F with no significant medical history who presents to the ER with abdominal 

pain in setting of acute appendicitis which was ruled out by surgical service. 

GI requested to see the patient for gastritis. Denies nausea, vomiting. States 

has intermittent epigastric pain. NO history of NSIAD use. Continue PPI and 

follow up in office in 2 weeks for outpatient EGD 











Past Patient History





- Past Medical History & Family History


Past Medical History?: Yes





- Past Social History


Alcohol: Social


Drugs: Denies





- CARDIAC


Hx Cardiac Disorders: No





- PULMONARY


Hx Respiratory Disorders: Yes


Hx Asthma: Yes (Childhood Asthma)


Hx Bronchitis: Yes


Hx Pneumonia: Yes





- NEUROLOGICAL


Hx Neurological Disorder: No





- HEENT


Hx HEENT Problems: No





- RENAL


Hx Chronic Kidney Disease: No





- ENDOCRINE/METABOLIC


Hx Endocrine Disorders: No





- HEMATOLOGICAL/ONCOLOGICAL


Hx Blood Disorders: No





- INTEGUMENTARY


Hx Dermatological Problems: No





- MUSCULOSKELETAL/RHEUMATOLOGICAL


Hx Musculoskeletal Disorders: No


Hx Falls: No





- GASTROINTESTINAL


Hx Gastrointestinal Disorders: Yes


Other/Comment: Hx of stomach upsets after eating too fast or spicy food as per 

pt





- GENITOURINARY/GYNECOLOGICAL


Hx Genitourinary Disorders: No





- PSYCHIATRIC


Hx Substance Use: No





- SURGICAL HISTORY


Hx Surgeries: No





- ANESTHESIA


Hx Anesthesia: No





Meds


Home Medications: 


 Home Medication List











 Medication  Instructions  Recorded  Confirmed  Type


 


Amoxicillin/Clavulanate [Augmentin 1 tab PO Q12 #20 tab 01/23/18  Rx





875 MG-125 MG]    


 


Pantoprazole [Protonix EC Tab] 40 mg PO DAILY #30 ect 01/23/18  Rx











Allergies/Adverse Reactions: 


 Allergies











Allergy/AdvReac Type Severity Reaction Status Date / Time


 


No Known Allergies Allergy   Verified 01/22/18 19:53














- Medications


Medications: 


 Current Medications





Famotidine (Pepcid)  20 mg IVP DAILY [facility]


   Last Admin: 01/23/18 08:47 Dose:  20 mg


Piperacillin Sod/Tazobactam (Sod 3.375 gm/ Sodium Chloride)  100 mls @ 100 mls/

hr IVPB Q6H [facility]


   PRN Reason: Protocol


   Last Admin: 01/23/18 10:59 Dose:  100 mls/hr


Lactated Ringer's (Lactated Ringer's)  1,000 mls @ 125 mls/hr IV .Q8H [facility]


   Last Admin: 01/23/18 05:33 Dose:  125 mls/hr


Ketorolac Tromethamine (Toradol)  30 mg IVP Q6 PRN


   PRN Reason: Pain, severe (8-10)


Ketorolac Tromethamine (Toradol)  15 mg IVP Q6 PRN


   PRN Reason: Pain, moderate (4-7)


   Last Admin: 01/23/18 13:40 Dose:  15 mg


Ondansetron HCl (Zofran Inj)  4 mg IVP Q4 PRN


   PRN Reason: Nausea/Vomiting


   Last Admin: 01/23/18 11:18 Dose:  4 mg


Pantoprazole Sodium (Protonix Ec Tab)  40 mg PO DAILY [facility]











Results





- Vital Signs


Recent Vital Signs: 


 Last Vital Signs











Temp  97.9 F   01/23/18 08:16


 


Pulse  64   01/23/18 08:16


 


Resp  20   01/23/18 08:16


 


BP  117/73   01/23/18 08:16


 


Pulse Ox  98   01/23/18 08:16














- Labs


Result Diagrams: 


 01/23/18 05:45





 01/22/18 21:33


Labs: 


 Laboratory Results - last 24 hr











  01/22/18 01/22/18 01/22/18





  21:33 21:33 21:33


 


WBC  11.5 H  


 


RBC  4.20  


 


Hgb  12.6  


 


Hct  37.6  


 


MCV  89.7  


 


MCH  30.0  


 


MCHC  33.5  


 


RDW  13.5  


 


Plt Count  277  


 


MPV  7.3  


 


Neut % (Auto)  67.1  


 


Lymph % (Auto)  25.6  


 


Mono % (Auto)  6.4  


 


Eos % (Auto)  0.6  


 


Baso % (Auto)  0.3  


 


Neut #  7.7 H  


 


Lymph #  2.9  


 


Mono #  0.7  


 


Eos #  0.1  


 


Baso #  0.0  


 


Sodium   141 


 


Potassium   4.1 


 


Chloride   102 


 


Carbon Dioxide   26 


 


Anion Gap   17 


 


BUN   8 


 


Creatinine   0.6 L 


 


Est GFR ( Amer)   > 60 


 


Est GFR (Non-Af Amer)   > 60 


 


Random Glucose   91 


 


Lactic Acid   


 


Calcium   9.7 


 


Total Bilirubin   0.4 


 


AST   30 


 


ALT   50 


 


Alkaline Phosphatase   74 


 


Total Protein   8.1 


 


Albumin   4.6 


 


Globulin   3.6 


 


Albumin/Globulin Ratio   1.3 


 


Lipase   77 


 


Urine Color    Straw


 


Urine Clarity    Clear


 


Urine pH    6.0


 


Ur Specific Gravity    1.012


 


Urine Protein    Negative


 


Urine Glucose (UA)    Neg


 


Urine Ketones    Trace


 


Urine Blood    Small


 


Urine Nitrate    Negative


 


Urine Bilirubin    Negative


 


Urine Urobilinogen    0.2-1.0


 


Ur Leukocyte Esterase    Small


 


Urine RBC (Auto)    7 H


 


Urine Microscopic WBC    8 H


 


Ur Squamous Epith Cells    2


 


Urine Bacteria    Rare














  01/22/18 01/23/18





  21:33 05:45


 


WBC   7.8


 


RBC   3.94


 


Hgb   11.9 L


 


Hct   35.0


 


MCV   89.0


 


MCH   30.1


 


MCHC   33.8


 


RDW   13.1


 


Plt Count   268


 


MPV   7.5


 


Neut % (Auto)   58.1


 


Lymph % (Auto)   31.1


 


Mono % (Auto)   9.1


 


Eos % (Auto)   1.2


 


Baso % (Auto)   0.5


 


Neut #   4.5


 


Lymph #   2.4


 


Mono #   0.7


 


Eos #   0.1


 


Baso #   0.0


 


Sodium  


 


Potassium  


 


Chloride  


 


Carbon Dioxide  


 


Anion Gap  


 


BUN  


 


Creatinine  


 


Est GFR ( Amer)  


 


Est GFR (Non-Af Amer)  


 


Random Glucose  


 


Lactic Acid  0.8 


 


Calcium  


 


Total Bilirubin  


 


AST  


 


ALT  


 


Alkaline Phosphatase  


 


Total Protein  


 


Albumin  


 


Globulin  


 


Albumin/Globulin Ratio  


 


Lipase  


 


Urine Color  


 


Urine Clarity  


 


Urine pH  


 


Ur Specific Gravity  


 


Urine Protein  


 


Urine Glucose (UA)  


 


Urine Ketones  


 


Urine Blood  


 


Urine Nitrate  


 


Urine Bilirubin  


 


Urine Urobilinogen  


 


Ur Leukocyte Esterase  


 


Urine RBC (Auto)  


 


Urine Microscopic WBC  


 


Ur Squamous Epith Cells  


 


Urine Bacteria
History of Present Illness





- History of Present Illness


History of Present Illness: 





General Surgery Consult Note for Dr. Muñoz


Reason for Consult: abdominal pain





22 F with no significant past medical history presents to [facility] for abdominal 

pain. She states that she has had the pain for about 1.5 days. Patient went to 

Urgent care facility yesterday because pain had gotten worse and they referred 

her for Ct scan. This prompted her to be seen at [facility]. Patient denies 

associated nausea/vomiting or diarrhea. 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 She reports that she has been 

very fatigued last few days. She rates pain as moderate in severity. She 

describes pain as intermittent and dull located in the epigastric/

periumbilical. She denies any exacerbating or alleviating factors. Denies fever/

chills, chest pain, SOB, incontinence, melena, hematochezia, urinary symptoms. 

CT abdomen/pelvis reveals 8mm fluid filled appendix with thickening.





PMH: Childhood Asthma


Medications: Denies


Allergies: NKDA


PSH: Denies


FH: unknown


SH: No illegal drug use; Never Smoked; occasional Alcohol, Live with family ; 

works as a teacher

















Review of Systems





- Review of Systems


All systems: reviewed and no additional remarkable complaints except (as per HPI

)





Past Patient History





- Past Social History


Alcohol: Social


Drugs: Denies





- PSYCHIATRIC


Hx Substance Use: No





- SURGICAL HISTORY


Hx Surgeries: No





- ANESTHESIA


Hx Anesthesia: No





Meds


Allergies/Adverse Reactions: 


 Allergies











Allergy/AdvReac Type Severity Reaction Status Date / Time


 


No Known Allergies Allergy   Verified 01/22/18 19:53














- Medications


Medications: 


 Current Medications





Famotidine (Pepcid)  20 mg IVP DAILY [facility]


Sodium Chloride (Sodium Chloride 0.9%)  1,000 mls @ 250 mls/hr IV .Q4H [facility]


   Stop: 01/23/18 03:30


Piperacillin Sod/Tazobactam (Sod 3.375 gm/ Sodium Chloride)  100 mls @ 100 mls/

hr IVPB STAT [facility]


   PRN Reason: Protocol


   Stop: 01/23/18 00:29


Piperacillin Sod/Tazobactam (Sod 3.375 gm/ Sodium Chloride)  100 mls @ 100 mls/

hr IVPB Q6 [facility]


   PRN Reason: Protocol


Ondansetron HCl (Zofran Inj)  4 mg IVP Q4 PRN


   PRN Reason: Nausea/Vomiting











Physical Exam





- Constitutional


Appears: Well, No Acute Distress





- Head Exam


Head Exam: ATRAUMATIC, NORMOCEPHALIC





- Eye Exam


Eye Exam: EOMI, Normal appearance


Pupil Exam: PERRL





- ENT Exam


ENT Exam: Mucous Membranes Moist





- Respiratory Exam


Respiratory Exam: NORMAL BREATHING PATTERN





- Cardiovascular Exam


Cardiovascular Exam: REGULAR RHYTHM





- GI/Abdominal Exam


GI & Abdominal Exam: Soft, Tenderness (epigastric/periumbilical).  absent: 

Distended, Firm, Guarding, Hernia, Rebound, Rigid


Additional comments: 





(-)Mcburney's, Rovsing, obturator





- Extremities Exam


Extremities exam: Positive for: normal capillary refill, pedal pulses present.  

Negative for: calf tenderness





- Back Exam


Back exam: absent: CVA tenderness (L), CVA tenderness (R)





- Neurological Exam


Neurological exam: Alert, CN II-XII Intact, Oriented x3





- Psychiatric Exam


Psychiatric exam: Normal Affect, Normal Mood





- Skin


Skin Exam: Dry, Intact, Normal Color, Warm





Results





- Vital Signs


Recent Vital Signs: 


 Last Vital Signs











Temp  98.3 F   01/22/18 19:49


 


Pulse  72   01/22/18 19:49


 


Resp  16   01/22/18 19:49


 


BP  123/77   01/22/18 19:49


 


Pulse Ox  100   01/22/18 23:20














- Labs


Result Diagrams: 


 01/22/18 21:33





 01/22/18 21:33


Labs: 


 Laboratory Results - last 24 hr











  01/22/18 01/22/18 01/22/18





  21:33 21:33 21:33


 


WBC  11.5 H  


 


RBC  4.20  


 


Hgb  12.6  


 


Hct  37.6  


 


MCV  89.7  


 


MCH  30.0  


 


MCHC  33.5  


 


RDW  13.5  


 


Plt Count  277  


 


MPV  7.3  


 


Neut % (Auto)  67.1  


 


Lymph % (Auto)  25.6  


 


Mono % (Auto)  6.4  


 


Eos % (Auto)  0.6  


 


Baso % (Auto)  0.3  


 


Neut #  7.7 H  


 


Lymph #  2.9  


 


Mono #  0.7  


 


Eos #  0.1  


 


Baso #  0.0  


 


Sodium   141 


 


Potassium   4.1 


 


Chloride   102 


 


Carbon Dioxide   26 


 


Anion Gap   17 


 


BUN   8 


 


Creatinine   0.6 L 


 


Est GFR ( Amer)   > 60 


 


Est GFR (Non-Af Amer)   > 60 


 


Random Glucose   91 


 


Lactic Acid   


 


Calcium   9.7 


 


Total Bilirubin   0.4 


 


AST   30 


 


ALT   50 


 


Alkaline Phosphatase   74 


 


Total Protein   8.1 


 


Albumin   4.6 


 


Globulin   3.6 


 


Albumin/Globulin Ratio   1.3 


 


Lipase   77 


 


Urine Color    Straw


 


Urine Clarity    Clear


 


Urine pH    6.0


 


Ur Specific Gravity    1.012


 


Urine Protein    Negative


 


Urine Glucose (UA)    Neg


 


Urine Ketones    Trace


 


Urine Blood    Small


 


Urine Nitrate    Negative


 


Urine Bilirubin    Negative


 


Urine Urobilinogen    0.2-1.0


 


Ur Leukocyte Esterase    Small


 


Urine RBC (Auto)    7 H


 


Urine Microscopic WBC    8 H


 


Ur Squamous Epith Cells    2


 


Urine Bacteria    Rare














  01/22/18





  21:33


 


WBC 


 


RBC 


 


Hgb 


 


Hct 


 


MCV 


 


MCH 


 


MCHC 


 


RDW 


 


Plt Count 


 


MPV 


 


Neut % (Auto) 


 


Lymph % (Auto) 


 


Mono % (Auto) 


 


Eos % (Auto) 


 


Baso % (Auto) 


 


Neut # 


 


Lymph # 


 


Mono # 


 


Eos # 


 


Baso # 


 


Sodium 


 


Potassium 


 


Chloride 


 


Carbon Dioxide 


 


Anion Gap 


 


BUN 


 


Creatinine 


 


Est GFR ( Amer) 


 


Est GFR (Non-Af Amer) 


 


Random Glucose 


 


Lactic Acid  0.8


 


Calcium 


 


Total Bilirubin 


 


AST 


 


ALT 


 


Alkaline Phosphatase 


 


Total Protein 


 


Albumin 


 


Globulin 


 


Albumin/Globulin Ratio 


 


Lipase 


 


Urine Color 


 


Urine Clarity 


 


Urine pH 


 


Ur Specific Gravity 


 


Urine Protein 


 


Urine Glucose (UA) 


 


Urine Ketones 


 


Urine Blood 


 


Urine Nitrate 


 


Urine Bilirubin 


 


Urine Urobilinogen 


 


Ur Leukocyte Esterase 


 


Urine RBC (Auto) 


 


Urine Microscopic WBC 


 


Ur Squamous Epith Cells 


 


Urine Bacteria 














Assessment & Plan





- Assessment and Plan (Free Text)


Plan: 





22 F with abdominal pain with CT findings of 8mm fluid filled appendix with 

thickening





-NPO


-IV antibiotics


-IV fluids


-Analgesics/anti-emetics PRN


-Plan for OR later today


-Discussed with Dr. Toni Davis PGY1
History of Present Illness





- History of Present Illness


History of Present Illness: 


PGY4 Initial GI Consult





Renetta Cote is a 22F w/ no significant medical history who presented to the 

ER with complaints if abdominal pain. Pt states that the onset was sudden and 2 

days ago. She notes that after she had a free sample of coffee at  [employer], 

she started to experience severe abd pain located in the epigastrum. She denies 

any radiation of the pain. She notes that the pain never alleviated which 

brought her to Urgent Care. Jeremi refered her to ER for further eval and to 

rule out apendicitis. In the ER she was noted to have appediceal fluid 

collection but no inflammation. CT findings as per radiologist suggested early 

appendicitis. She was started on Zosyn and surgery recommended conservative 

management. GI was consulted for further eval. 


She notes that she started to become constipated after receiving pain 

medication in the ER. She was able to have a BM today after an enema. She 

denies any hx of constipation at home. She notes that she had a period of a few 

months , last year when she started to have chronic diarrhea. She denies any 

fever, chills or diaphoresis. She does work in a day care and notes that the 

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 





PMHx: Asthma


PSHx: None


Social hx: denies any illicit drug use or smoking, social drinker


Endo hx: none


Family hx: her mother has GI issues but pt does not recall diagnosis or tx





ROS: 12-point ROS conducted neg other than above








Past Patient History





- Past Medical History & Family History


Past Medical History?: Yes





- Past Social History


Alcohol: Social


Drugs: Denies





- CARDIAC


Hx Cardiac Disorders: No





- PULMONARY


Hx Respiratory Disorders: Yes


Hx Asthma: Yes (Childhood Asthma)


Hx Bronchitis: Yes


Hx Pneumonia: Yes





- NEUROLOGICAL


Hx Neurological Disorder: No





- HEENT


Hx HEENT Problems: No





- RENAL


Hx Chronic Kidney Disease: No





- ENDOCRINE/METABOLIC


Hx Endocrine Disorders: No





- HEMATOLOGICAL/ONCOLOGICAL


Hx Blood Disorders: No





- INTEGUMENTARY


Hx Dermatological Problems: No





- MUSCULOSKELETAL/RHEUMATOLOGICAL


Hx Musculoskeletal Disorders: No


Hx Falls: No





- GASTROINTESTINAL


Hx Gastrointestinal Disorders: Yes


Other/Comment: Hx of stomach upsets after eating too fast or spicy food as per 

pt





- GENITOURINARY/GYNECOLOGICAL


Hx Genitourinary Disorders: No





- PSYCHIATRIC


Hx Substance Use: No





- SURGICAL HISTORY


Hx Surgeries: No





- ANESTHESIA


Hx Anesthesia: No





Meds


Allergies/Adverse Reactions: 


 Allergies











Allergy/AdvReac Type Severity Reaction Status Date / Time


 


No Known Allergies Allergy   Verified 01/22/18 19:53














- Medications


Medications: 


 Current Medications





Famotidine (Pepcid)  20 mg IVP DAILY [facility]


   Last Admin: 01/23/18 08:47 Dose:  20 mg


Hydromorphone HCl (Dilaudid)  0.5 mg IVP Q3H PRN


   PRN Reason: Pain, severe (8-10)


   Last Admin: 01/23/18 11:08 Dose:  0.5 mg


Piperacillin Sod/Tazobactam (Sod 3.375 gm/ Sodium Chloride)  100 mls @ 100 mls/

hr IVPB Q6H [facility]


   PRN Reason: Protocol


   Last Admin: 01/23/18 10:59 Dose:  100 mls/hr


Lactated Ringer's (Lactated Ringer's)  1,000 mls @ 125 mls/hr IV .Q8H [facility]


   Last Admin: 01/23/18 05:33 Dose:  125 mls/hr


Ondansetron HCl (Zofran Inj)  4 mg IVP Q4 PRN


   PRN Reason: Nausea/Vomiting


   Last Admin: 01/23/18 11:18 Dose:  4 mg


Pantoprazole Sodium (Protonix Ec Tab)  20 mg PO DAILY [facility]


   Last Admin: 01/23/18 10:58 Dose:  20 mg











Physical Exam





- Constitutional


Appears: No Acute Distress





- Head Exam


Head Exam: ATRAUMATIC, NORMOCEPHALIC





- Eye Exam


Eye Exam: Normal appearance





- ENT Exam


ENT Exam: Mucous Membranes Moist





- Respiratory Exam


Respiratory Exam: Clear to Auscultation Bilateral, NORMAL BREATHING PATTERN.  

absent: Rales, Rhonchi, Wheezes, Respiratory Distress





- Cardiovascular Exam


Cardiovascular Exam: REGULAR RHYTHM, +S1, +S2





- GI/Abdominal Exam


GI & Abdominal Exam: Normal Bowel Sounds, Soft, Tenderness (slight tenderness 

in the epigastrum).  absent: Guarding, Hernia, Organomegaly





- Extremities Exam


Extremities exam: Negative for: joint swelling, pedal edema





- Neurological Exam


Neurological exam: Alert, Oriented x3





- Psychiatric Exam


Psychiatric exam: Normal Affect, Normal Mood





- Skin


Skin Exam: Dry, Intact, Normal Color, Warm





Results





- Vital Signs


Recent Vital Signs: 


 Last Vital Signs











Temp  97.9 F   01/23/18 08:16


 


Pulse  64   01/23/18 08:16


 


Resp  20   01/23/18 08:16


 


BP  117/73   01/23/18 08:16


 


Pulse Ox  98   01/23/18 08:16














- Labs


Result Diagrams: 


 01/23/18 05:45





 01/22/18 21:33


Labs: 


 Laboratory Results - last 24 hr











  01/22/18 01/22/18 01/22/18





  21:33 21:33 21:33


 


WBC  11.5 H  


 


RBC  4.20  


 


Hgb  12.6  


 


Hct  37.6  


 


MCV  89.7  


 


MCH  30.0  


 


MCHC  33.5  


 


RDW  13.5  


 


Plt Count  277  


 


MPV  7.3  


 


Neut % (Auto)  67.1  


 


Lymph % (Auto)  25.6  


 


Mono % (Auto)  6.4  


 


Eos % (Auto)  0.6  


 


Baso % (Auto)  0.3  


 


Neut #  7.7 H  


 


Lymph #  2.9  


 


Mono #  0.7  


 


Eos #  0.1  


 


Baso #  0.0  


 


Sodium   141 


 


Potassium   4.1 


 


Chloride   102 


 


Carbon Dioxide   26 


 


Anion Gap   17 


 


BUN   8 


 


Creatinine   0.6 L 


 


Est GFR ( Amer)   > 60 


 


Est GFR (Non-Af Amer)   > 60 


 


Random Glucose   91 


 


Lactic Acid   


 


Calcium   9.7 


 


Total Bilirubin   0.4 


 


AST   30 


 


ALT   50 


 


Alkaline Phosphatase   74 


 


Total Protein   8.1 


 


Albumin   4.6 


 


Globulin   3.6 


 


Albumin/Globulin Ratio   1.3 


 


Lipase   77 


 


Urine Color    Straw


 


Urine Clarity    Clear


 


Urine pH    6.0


 


Ur Specific Gravity    1.012


 


Urine Protein    Negative


 


Urine Glucose (UA)    Neg


 


Urine Ketones    Trace


 


Urine Blood    Small


 


Urine Nitrate    Negative


 


Urine Bilirubin    Negative


 


Urine Urobilinogen    0.2-1.0


 


Ur Leukocyte Esterase    Small


 


Urine RBC (Auto)    7 H


 


Urine Microscopic WBC    8 H


 


Ur Squamous Epith Cells    2


 


Urine Bacteria    Rare














  01/22/18 01/23/18





  21:33 05:45


 


WBC   7.8


 


RBC   3.94


 


Hgb   11.9 L


 


Hct   35.0


 


MCV   89.0


 


MCH   30.1


 


MCHC   33.8


 


RDW   13.1


 


Plt Count   268


 


MPV   7.5


 


Neut % (Auto)   58.1


 


Lymph % (Auto)   31.1


 


Mono % (Auto)   9.1


 


Eos % (Auto)   1.2


 


Baso % (Auto)   0.5


 


Neut #   4.5


 


Lymph #   2.4


 


Mono #   0.7


 


Eos #   0.1


 


Baso #   0.0


 


Sodium  


 


Potassium  


 


Chloride  


 


Carbon Dioxide  


 


Anion Gap  


 


BUN  


 


Creatinine  


 


Est GFR ( Amer)  


 


Est GFR (Non-Af Amer)  


 


Random Glucose  


 


Lactic Acid  0.8 


 


Calcium  


 


Total Bilirubin  


 


AST  


 


ALT  


 


Alkaline Phosphatase  


 


Total Protein  


 


Albumin  


 


Globulin  


 


Albumin/Globulin Ratio  


 


Lipase  


 


Urine Color  


 


Urine Clarity  


 


Urine pH  


 


Ur Specific Gravity  


 


Urine Protein  


 


Urine Glucose (UA)  


 


Urine Ketones  


 


Urine Blood  


 


Urine Nitrate  


 


Urine Bilirubin  


 


Urine Urobilinogen  


 


Ur Leukocyte Esterase  


 


Urine RBC (Auto)  


 


Urine Microscopic WBC  


 


Ur Squamous Epith Cells  


 


Urine Bacteria  














Assessment & Plan





- Assessment and Plan (Free Text)


Assessment: 


Renetta Cote is a 22F w/ no sig medical hx who presents to the ER with abd 

pain. Her pain is located in the epigastrum





Abd pain etiology unknown; DDx: gastritis, early appendicitis, PUD, masked 

gastroenteritis 


Constipation likely opiod induced


Early appendicitis?





Plan: 


-abx as per surgery 


-recommend continues protonix or omeprazaole 40mg PO in the AM


-advance diet as tolerated


-send for stool studies 


-decrease opiod, likely causing constipation '


-recommend f/u with Dr. Salinas in 2 weeks at her office


-EGD as an oupt 


-avoid NSAIDs





D/W Dr. Salinas
Dr. Hannah- [facility]- [phone]
n/a